# Patient Record
Sex: MALE | Race: WHITE
[De-identification: names, ages, dates, MRNs, and addresses within clinical notes are randomized per-mention and may not be internally consistent; named-entity substitution may affect disease eponyms.]

---

## 2018-06-25 ENCOUNTER — HOSPITAL ENCOUNTER (EMERGENCY)
Dept: HOSPITAL 17 - PHEFT | Age: 43
Discharge: HOME | End: 2018-06-25
Payer: COMMERCIAL

## 2018-06-25 VITALS
OXYGEN SATURATION: 100 % | RESPIRATION RATE: 16 BRPM | HEART RATE: 92 BPM | SYSTOLIC BLOOD PRESSURE: 138 MMHG | TEMPERATURE: 98 F | DIASTOLIC BLOOD PRESSURE: 85 MMHG

## 2018-06-25 VITALS — WEIGHT: 147.27 LBS | HEIGHT: 65 IN | BODY MASS INDEX: 24.54 KG/M2

## 2018-06-25 DIAGNOSIS — S16.1XXA: ICD-10-CM
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
## 2018-06-25 DIAGNOSIS — R51: ICD-10-CM

## 2018-06-25 DIAGNOSIS — M54.2: ICD-10-CM

## 2018-06-25 DIAGNOSIS — S06.0X9A: Primary | ICD-10-CM

## 2018-06-25 PROCEDURE — 72125 CT NECK SPINE W/O DYE: CPT

## 2018-06-25 PROCEDURE — 70450 CT HEAD/BRAIN W/O DYE: CPT

## 2018-06-25 PROCEDURE — 99283 EMERGENCY DEPT VISIT LOW MDM: CPT

## 2018-06-25 NOTE — RADRPT
EXAM DATE:  6/25/2018 1:26 PM EDT

AGE/SEX:        42 years / Male



INDICATIONS:  Fell back and hit head last night. Neck pain.



CLINICAL DATA:  This is the patient's initial encounter. Patient reports that signs and symptoms have
 been present for 2 days and indicates a pain score of 6/10. 

                                                                          

MEDICAL/SURGICAL HISTORY:       None. None.



RADIATION DOSE:  25.07 CTDI (mGy)







COMPARISON:      No prior exams available for comparison.



TECHNIQUE:  Contiguous axial images were obtained using helical multirow detector technique.  The vol
umetric data was post-processed with multiplanar reconstruction in oblique axial, sagittal, and coron
al planes. Using automated exposure control and adjustment of the mA and/or kV according to patient s
ize, radiation dose was kept as low as reasonably achievable to obtain optimal diagnostic quality jocelynn
ges.



FINDINGS: 

Vertebrae:  Normal vertebral body height.

Alignment:  Straightening of the normal cervical lordosis



C2-3:  The bony spinal canal is normal in size.  No evidence of disc bulge or herniation.  The neural
 foramina are bilaterally patent.

C3-4:  The bony spinal canal is normal in size.  No evidence of disc bulge or herniation.  The neural
 foramina are bilaterally patent.

C4-5:  The bony spinal canal is normal in size.  No evidence of disc bulge or herniation.  The neural
 foramina are bilaterally patent.

C5-6:  Mild uncinate ridging with minimal disc bulging and bilateral neural foraminal encroachment.

C6-7:  The bony spinal canal is normal in size.  No evidence of disc bulge or herniation.  The neural
 foramina are bilaterally patent.

C7-T1:  The bony spinal canal is normal in size.  No evidence of disc bulge or herniation.  The neura
l foramina are bilaterally patent.



CONCLUSION:

1.  Mild degenerative changes at C5-C6 without spinal stenosis.

2.  Reversal normal cervical lordosis.

3.  No fracture



Electronically signed by: Branden Nicholas MD  6/25/2018 1:46 PM EDT

## 2018-06-25 NOTE — RADRPT
EXAM DATE:  6/25/2018 1:19 PM EDT

AGE/SEX:        42 years / Male



INDICATIONS:  Fell back and hit head last night. Feels "foggy" since.



CLINICAL DATA:  This is the patient's initial encounter. Patient reports that signs and symptoms have
 been present for 2 days and indicates a pain score of 7/10. 

                                                                          

MEDICAL/SURGICAL HISTORY:   None. None.



RADIATION DOSE:  59.23 CTDI (mGy)







COMPARISON:      No prior exams available for comparison.





TECHNIQUE:  CT of the head without contrast.  Using automated exposure control and adjustment of the 
mA and/or kV according to patient size, radiation dose was kept as low as reasonably achievable to ob
tain optimal diagnostic quality images.



FINDINGS: 

Cerebrum:  The ventricles are normal for age.  No evidence of midline shift, mass lesion, hemorrhage 
or acute infarction.  No extraaxial fluid collections are seen.

Posterior Fossa:  The cerebellum and brainstem are intact.  The 4th ventricle is midline.  The cerebe
llopontine angle is unremarkable.

Extracranial:  The visualized portion of the orbits is intact.

Skull:  The calvaria is intact.  No evidence of skull fracture.



CONCLUSION:

1.  Negative for acute process.



Electronically signed by: Branden Nicholas MD  6/25/2018 1:43 PM EDT

## 2018-06-25 NOTE — PD
HPI


Chief Complaint:  Head Injury


Time Seen by Provider:  12:32


Travel History


International Travel<30 days:  No


Contact w/Intl Traveler<30days:  No


Traveled to known affect area:  No





History of Present Illness


HPI


42-year-old male with head and neck pain after he fell from a standing position 

2 days ago.  Patient reports while heavily drinking he fell from a standing 

position hitting his head.  It was reported he had loss of consciousness.  He 

was evaluated by paramedics at the time refused medical attention.  Since the 

event he has had a mild generalized headache and feels slightly fatigued.  He 

also has posterior neck pain.  Denies altered sensation or weakness of the 

extremities.  Symptom severity is mild.  Neck pain is aggravated by movement 

and relieved with rest.





PFSH


Past Medical History


Medical History:  Denies Significant Hx


Pregnant?:  Not Pregnant





Social History


Alcohol Use:  Yes (OCC)


Tobacco Use:  No





Allergies-Medications


(Allergen,Severity, Reaction):  


Coded Allergies:  


     No Known Allergies (Verified  Adverse Reaction, Unknown, 6/25/18)


Reported Meds & Prescriptions





Reported Meds & Active Scripts


Active


No Active Prescriptions or Reported Medications    








Review of Systems


Except as stated in HPI:  all other systems reviewed are Neg


General / Constitutional:  No: Fever


Eyes:  No: Visual changes


HENT:  No: Headaches


Cardiovascular:  No: Chest Pain or Discomfort


Respiratory:  No: Shortness of Breath


Gastrointestinal:  No: Abdominal Pain


Genitourinary:  No: Dysuria


Musculoskeletal:  No: Pain





Physical Exam


Narrative


GENERAL: Alert and well-appearing 42-year-old male.


SKIN: Warm and dry.


HEAD: Normocephalic.  Atraumatic


EYES: Pupils equal, round, reactive to light.  EOMs intact.  No injection or 

drainage. 


NECK: Supple, trachea midline.  Mild posterior neck pain including tenderness 

of the midline spine.  Primary tenderness is over the left trapezius muscle.  

He can freely move the neck.


CARDIOVASCULAR: Regular rate and rhythm without murmurs, gallops, or rubs. 


RESPIRATORY: Breath sounds equal bilaterally. No accessory muscle use.


GASTROINTESTINAL: Abdomen soft, non-tender, nondistended. 


MUSCULOSKELETAL: No cyanosis, or edema.  Normal strength and sensation of the 

extremities.


BACK: Nontender without obvious deformity. No CVA tenderness.


NEUROLOGICAL: Awake and alert.  No obvious cranial nerve deficit.  Motor and 

sensory grossly within normal  


limits. Five out of 5 muscle strength in all muscle groups.  Normal speech.








Data


Data


Last Documented VS





Vital Signs








  Date Time  Temp Pulse Resp B/P (MAP) Pulse Ox O2 Delivery O2 Flow Rate FiO2


 


6/25/18 11:58 98.0 92 16 138/85 (102) 100   








Orders





 Orders


Ct Brain W/O Iv Contrast(Rout) (6/25/18 )


Ct Cerv Spine W/O Contrast (6/25/18 )








MDM


Medical Decision Making


Medical Screen Exam Complete:  Yes


Emergency Medical Condition:  Yes


Differential Diagnosis


Concussion, cervical strain, cervical spine fracture, ICH


Narrative Course


22-year-old male here for evaluation of neck pain after he fell from standing 

position 2 days ago.  He has a normal neurologic exam.  CT the brain and 

cervical spine are negative for acute abnormalities.  He is well-appearing.  

Exam is consistent with mild concussion and cervical strain.  He is stable and 

ready for discharge.





Diagnosis





 Primary Impression:  


 Concussion


 Qualified Codes:  S06.0X9A - Concussion with loss of consciousness of 

unspecified duration, initial encounter


 Additional Impression:  


 Cervical strain


 Qualified Codes:  S16.1XXA - Strain of muscle, fascia and tendon at neck level

, initial encounter


Referrals:  


Primary Care Physician





***Additional Instructions:  


Medication as directed.


Ice and/or heat for muscle spasm


Follow-up with your primary doctor.


Scripts


Methocarbamol (Robaxin) 500 Mg Tab


500 MG PO TID for Muscle Spasm, #10 TAB 0 Refills


   Prov: Maru Jean-Baptiste         6/25/18


Disposition:  01 DISCHARGE HOME


Condition:  Stable











Maru Jean-Baptiste Jun 25, 2018 14:06